# Patient Record
Sex: MALE | Race: WHITE | NOT HISPANIC OR LATINO | Employment: FULL TIME | URBAN - METROPOLITAN AREA
[De-identification: names, ages, dates, MRNs, and addresses within clinical notes are randomized per-mention and may not be internally consistent; named-entity substitution may affect disease eponyms.]

---

## 2022-04-18 ENCOUNTER — OFFICE VISIT (OUTPATIENT)
Dept: URGENT CARE | Facility: MEDICAL CENTER | Age: 57
End: 2022-04-18
Payer: COMMERCIAL

## 2022-04-18 VITALS
SYSTOLIC BLOOD PRESSURE: 124 MMHG | RESPIRATION RATE: 18 BRPM | DIASTOLIC BLOOD PRESSURE: 80 MMHG | TEMPERATURE: 98.1 F | HEART RATE: 91 BPM | OXYGEN SATURATION: 97 %

## 2022-04-18 DIAGNOSIS — A09 TRAVELER'S DIARRHEA: Primary | ICD-10-CM

## 2022-04-18 PROCEDURE — G0383 LEV 4 HOSP TYPE B ED VISIT: HCPCS | Performed by: PHYSICIAN ASSISTANT

## 2022-04-18 PROCEDURE — S9083 URGENT CARE CENTER GLOBAL: HCPCS | Performed by: PHYSICIAN ASSISTANT

## 2022-04-18 RX ORDER — CIPROFLOXACIN 500 MG/1
500 TABLET, FILM COATED ORAL EVERY 12 HOURS SCHEDULED
Qty: 14 TABLET | Refills: 0 | Status: SHIPPED | OUTPATIENT
Start: 2022-04-18 | End: 2022-04-25

## 2022-04-18 NOTE — PATIENT INSTRUCTIONS
Patient was educated on travelers diarrhea  Patient was prescribed antibiotics  Patient was told to eat on antibiotics  Patient was told if symptoms persist follow up with PCP or go to ED  Traveler's Diarrhea   WHAT YOU NEED TO KNOW:   Traveler's diarrhea occurs during travel or within 10 days after you travel  You can get traveler's diarrhea when you eat or drink contaminated food or water  The food or water may contain bacteria, a virus, or a parasite  Water from a faucet, ice, or drinks that are not sealed can be contaminated  Foods that are prepared with tap water or not cooked properly can also be contaminated  DISCHARGE INSTRUCTIONS:   Return to the emergency department if:   · You urinate less than usual or stop urinating  · You see blood in your bowel movement  · You have severe abdominal pain  · You feel like you are going to faint  · You are too weak to stand up  · You are dizzy or lightheaded  Contact your healthcare provider if:   · Your symptoms do not get better with treatment  · Your diarrhea lasts for more than 7 days  · You have questions or concerns about your condition or care  Medicines:   · Medicines  can help decrease diarrhea or nausea, or treat an infection caused by bacteria or a parasite  · Take your medicine as directed  Contact your healthcare provider if you think your medicine is not helping or if you have side effects  Tell him of her if you are allergic to any medicine  Keep a list of the medicines, vitamins, and herbs you take  Include the amounts, and when and why you take them  Bring the list or the pill bottles to follow-up visits  Carry your medicine list with you in case of an emergency  Manage your symptoms:   · Drink liquids as directed  Liquids will help prevent dehydration caused by diarrhea  Ask your healthcare provider how much liquid to drink each day and which liquids are best for you   You may need to drink an oral rehydration solution (ORS)  An ORS has the right amounts of water, salts, and sugar you need to replace body fluids  You can buy an ORS at most grocery stores and pharmacies  · Eat foods that are easy to digest   Examples include rice, lentils, cereal, bananas, potatoes, and bread  It also includes some fruits (bananas, melon), well-cooked vegetables, and lean meats  Do not drink alcohol until your diarrhea is gone  Prevent traveler's diarrhea:   · Ask if you should take certain medicines or get vaccines before you travel  Your healthcare provider may prescribe antibiotics to prevent traveler's diarrhea  Vaccines can help protect you against bacteria or viruses that cause traveler's diarrhea  · Drink only bottled, canned, or boiled liquids when you travel  Do not put ice in your drinks  Boil water for at least 4 minutes, or use purifying tablets to treat the water  Use bottled or treated water to brush your teeth  · Do not eat raw food or dairy when you travel  Examples include fruits, raw vegetables in salads, oysters, clams, or undercooked meat  Do not have milk, ice cream, or other dairy products  Eat foods that are served hot or steaming, breads, peeled fruits and vegetables, and grilled foods  · Wash your hands often  Use bottled water and soap  Wash your hands before you eat or prepare food  Also wash your hands after you use the bathroom  Follow up with your healthcare provider as directed:  Write down your questions so you remember to ask them during your visits  © Copyright [employer] 2022 Information is for End User's use only and may not be sold, redistributed or otherwise used for commercial purposes  All illustrations and images included in CareNotes® are the copyrighted property of [employer]  or Karyna Whipple   The above information is an  only  It is not intended as medical advice for individual conditions or treatments   Talk to your doctor, nurse or pharmacist before following any medical regimen to see if it is safe and effective for you

## 2022-04-18 NOTE — PROGRESS NOTES
[address] [facility]        NAME: Sophie Valentine is a 55 y o  male  : 1965    MRN: [id number]  DATE: 2022  TIME: 9:45 AM    Assessment and Plan   Traveler's diarrhea [A09]  1  Traveler's diarrhea  ciprofloxacin (CIPRO) 500 mg tablet         Patient Instructions       Patient was educated on travelers diarrhea  Patient was prescribed antibiotics  Patient was told to eat on antibiotics  Patient was told if symptoms persist follow up with PCP or go to ED  Chief Complaint     Chief Complaint   Patient presents with    Diarrhea     Patient c/o diarrhea x 5 days [place]  History of Present Illness       Patient is here today complaining of diarrhea for 3 days  Patient reports diarrhea started when he traveled to [facility]  Patient reports he has been trying to stay hydrated but all water comes out of him  Patient was tested for COVID 19 in [place] and reports this was negatie  Review of Systems   Review of Systems   Constitutional: Negative  HENT: Negative  Respiratory: Negative  Cardiovascular: Negative  Gastrointestinal: Positive for diarrhea and nausea  Psychiatric/Behavioral: Negative  Current Medications       Current Outpatient Medications:     ciprofloxacin (CIPRO) 500 mg tablet, Take 1 tablet (500 mg total) by mouth every 12 (twelve) hours for 7 days, Disp: 14 tablet, Rfl: 0    Current Allergies     Allergies as of 2022    (No Known Allergies)            The following portions of the patient's history were reviewed and updated as appropriate: allergies, current medications, past family history, past medical history, past social history, past surgical history and problem list      History reviewed  No pertinent past medical history  History reviewed  No pertinent surgical history  History reviewed  No pertinent family history  Medications have been verified          Objective   /80   Pulse 91 Temp 98 1 °F (36 7 °C)   Resp 18   SpO2 97%   No LMP for male patient  Physical Exam     Physical Exam  Constitutional:       Appearance: He is normal weight  HENT:      Head: Normocephalic  Right Ear: Tympanic membrane, ear canal and external ear normal       Left Ear: Tympanic membrane, ear canal and external ear normal       Nose: Nose normal       Mouth/Throat:      Mouth: Mucous membranes are moist       Pharynx: No oropharyngeal exudate or posterior oropharyngeal erythema  Eyes:      Pupils: Pupils are equal, round, and reactive to light  Cardiovascular:      Rate and Rhythm: Normal rate and regular rhythm  Heart sounds: Normal heart sounds  Pulmonary:      Breath sounds: Normal breath sounds  Abdominal:      General: Bowel sounds are normal       Palpations: Abdomen is soft  Tenderness: There is no abdominal tenderness  Neurological:      General: No focal deficit present  Mental Status: He is alert and oriented to person, place, and time     Psychiatric:         Mood and Affect: Mood normal          Behavior: Behavior normal